# Patient Record
Sex: FEMALE | Race: WHITE | NOT HISPANIC OR LATINO | URBAN - NONMETROPOLITAN AREA
[De-identification: names, ages, dates, MRNs, and addresses within clinical notes are randomized per-mention and may not be internally consistent; named-entity substitution may affect disease eponyms.]

---

## 2024-08-29 ENCOUNTER — OFFICE VISIT (OUTPATIENT)
Dept: URGENT CARE | Facility: CLINIC | Age: 69
End: 2024-08-29
Payer: MEDICARE

## 2024-08-29 VITALS
DIASTOLIC BLOOD PRESSURE: 76 MMHG | SYSTOLIC BLOOD PRESSURE: 122 MMHG | HEIGHT: 65 IN | WEIGHT: 160 LBS | HEART RATE: 81 BPM | BODY MASS INDEX: 26.66 KG/M2 | RESPIRATION RATE: 16 BRPM | TEMPERATURE: 97.8 F | OXYGEN SATURATION: 95 %

## 2024-08-29 DIAGNOSIS — L08.9 SKIN INFECTION: ICD-10-CM

## 2024-08-29 RX ORDER — MUPIROCIN 20 MG/G
1 OINTMENT TOPICAL 2 TIMES DAILY
Qty: 22 G | Refills: 0 | Status: SHIPPED | OUTPATIENT
Start: 2024-08-29

## 2024-08-29 RX ORDER — DOXYCYCLINE HYCLATE 100 MG
100 TABLET ORAL 2 TIMES DAILY
Qty: 10 TABLET | Refills: 0 | Status: SHIPPED | OUTPATIENT
Start: 2024-08-29 | End: 2024-09-03

## 2024-08-30 NOTE — PROGRESS NOTES
"Subjective:   Betsy Jefferson is a 69 y.o. female who presents for Laceration (Patient coming in for L wrist laceration possible infection)      HPI:    Patient presents urgent care with concerns of left distal wrist infection.  She states she was at a restaurant and states she was sitting on collapsed inward and she sustained a deep laceration along the distal.  She did not seek sutures for repair.  She states the wound has been healing up till couple days ago when she started increasing redness, pain.  Denies fever, chills, decreased range of motion, drainage.  Denies history of immunosuppression.  She has been applying antibiotic ointment to it and keeping it clean and dry.    ROS As above in HPI    Medications:    No current outpatient medications on file prior to visit.     No current facility-administered medications on file prior to visit.        Allergies:   Patient has no known allergies.    Problem List:   There is no problem list on file for this patient.       Surgical History:  No past surgical history on file.    Past Social Hx:           Problem list, medications, and allergies reviewed by myself today in Epic.     Objective:     /76   Pulse 81   Temp 36.6 °C (97.8 °F) (Temporal)   Resp 16   Ht 1.651 m (5' 5\")   Wt 72.6 kg (160 lb)   SpO2 95%   BMI 26.63 kg/m²     Physical Exam  Vitals and nursing note reviewed.   Constitutional:       General: She is not in acute distress.     Appearance: Normal appearance. She is not ill-appearing or diaphoretic.   HENT:      Head: Normocephalic.   Cardiovascular:      Rate and Rhythm: Normal rate and regular rhythm.      Heart sounds: Normal heart sounds.   Pulmonary:      Effort: Pulmonary effort is normal.      Breath sounds: Normal breath sounds.   Musculoskeletal:         General: No swelling, deformity or signs of injury.   Skin:     General: Skin is warm and dry.      Capillary Refill: Capillary refill takes less than 2 seconds.      Findings: " Erythema and wound present.      Comments: Left distal ulna has C shaped laceration wound with approximating margins. Mid wound is deep but appears to be healing via secondary intent. No drainage. Not fluctuant. Slightly tender to palpation, erythematous, and warm to the touch. No swelling.   Neurological:      Mental Status: She is alert and oriented to person, place, and time.         Assessment/Plan:       Diagnosis and associated orders:   1. Skin infection  - doxycycline (VIBRAMYCIN) 100 MG Tab; Take 1 Tablet by mouth 2 times a day for 5 days.  Dispense: 10 Tablet; Refill: 0  - mupirocin (BACTROBAN) 2 % Ointment; Apply 1 Application topically 2 times a day.  Dispense: 22 g; Refill: 0        Comments/MDM:     Left distal ulna as C shaped laceration healing by secondary intent. Wound is tender to palpation, erythematous, and warm to the touch. Not fluctuant or swollen. Patient reports two days of worsening pain. Likely inflamed, but will cover for infection with doxycycline. Home wound care instructions reviewed.   Follow up with PCP.       Return to clinic or go to ED if symptoms worsen or persist. Indications for ED discussed at length. Patient/Parent/Guardian voices understanding. Follow-up with your primary care provider in 3-5 days. Red flag symptoms discussed. All side effects of medication discussed including allergic response, GI upset, tendon injury, rash, sedation etc.    Please note that this dictation was created using voice recognition software. I have made a reasonable attempt to correct obvious errors, but I expect that there are errors of grammar and possibly content that I did not discover before finalizing the note.    This note was electronically signed by GARRETT Wilcox

## 2024-09-01 ENCOUNTER — APPOINTMENT (OUTPATIENT)
Dept: RADIOLOGY | Facility: IMAGING CENTER | Age: 69
End: 2024-09-01
Payer: MEDICARE

## 2024-09-01 ENCOUNTER — OFFICE VISIT (OUTPATIENT)
Dept: URGENT CARE | Facility: CLINIC | Age: 69
End: 2024-09-01
Payer: MEDICARE

## 2024-09-01 VITALS
OXYGEN SATURATION: 96 % | TEMPERATURE: 98.6 F | DIASTOLIC BLOOD PRESSURE: 84 MMHG | HEIGHT: 65 IN | WEIGHT: 160 LBS | HEART RATE: 86 BPM | BODY MASS INDEX: 26.66 KG/M2 | RESPIRATION RATE: 20 BRPM | SYSTOLIC BLOOD PRESSURE: 122 MMHG

## 2024-09-01 DIAGNOSIS — S99.922A INJURY OF TOE ON LEFT FOOT, INITIAL ENCOUNTER: ICD-10-CM

## 2024-09-01 PROCEDURE — 73660 X-RAY EXAM OF TOE(S): CPT | Mod: TC,LT

## 2024-09-01 PROCEDURE — 3079F DIAST BP 80-89 MM HG: CPT

## 2024-09-01 PROCEDURE — 99213 OFFICE O/P EST LOW 20 MIN: CPT

## 2024-09-01 PROCEDURE — 3074F SYST BP LT 130 MM HG: CPT

## 2024-09-01 ASSESSMENT — ENCOUNTER SYMPTOMS: FEVER: 0

## 2024-09-01 NOTE — PROGRESS NOTES
Subjective:   Betsy Jefferson is a very pleasant 69 y.o. female who presents for:    Chief Complaint   Patient presents with    Foot Injury     Toe injury x 2 weeks ago       HPI:    Foot Problem  Episode onset: two weeks ago, the patient tripped over a piee of cardboard on her stairs. The pain is localized to the second toe on the left foot. Pertinent negatives include no fever. Associated symptoms comments: Denies wound to the toe, no foot or ankle pain. Pain is exacerbated with movement, walking. She has tried nothing for the symptoms.       ROS:    Review of Systems   Constitutional:  Negative for fever.   Musculoskeletal:         Left second toe pain and bruising       Medications:      Current Outpatient Medications   Medication Sig    mupirocin (BACTROBAN) 2 % Ointment Apply 1 Application topically 2 times a day.    doxycycline (VIBRAMYCIN) 100 MG Tab Take 1 Tablet by mouth 2 times a day for 5 days. (Patient not taking: Reported on 9/1/2024)       Allergies:     No Known Allergies    Problem List:     There is no problem list on file for this patient.      Surgical History:    No past surgical history on file.    Past Social Hx:     Social History     Socioeconomic History    Marital status:         Past Family Hx:      No family history on file.    Problem list, medications, and allergies reviewed by myself today in Epic.     Objective:     Vitals:    09/01/24 1300   BP: 122/84   Pulse: 86   Resp: 20   Temp: 37 °C (98.6 °F)   SpO2: 96%       Physical Exam  Vitals reviewed.   Constitutional:       Appearance: Normal appearance. She is normal weight.   Musculoskeletal:        Feet:    Feet:      Comments: TTP over the volar and plantar aspect of the left second toe.  There is a small amount of ecchymosis overlying the toe.  No swelling.  Decreased range of motion secondary to pain.  No visible deformity.  Neurovascularly intact distal to the site of injury.  No pain at the base of the fifth toe, overlying  the foot, ankle  Neurological:      Mental Status: She is alert.     X-ray images viewed and interpreted by APRN, confirmed by radiology:        RADIOLOGY RESULTS   DX-TOE(S) 2+ LEFT    Result Date: 9/1/2024 9/1/2024 1:52 PM HISTORY/REASON FOR EXAM:  Pain/Deformity Following Trauma; trauma to left second toe. R/O fracture.. TECHNIQUE/EXAM DESCRIPTION AND NUMBER OF VIEWS:  3 views of the LEFT toes. COMPARISON: None FINDINGS: There is no fracture. Alignment is normal. No degenerative changes are present.     Negative left 2nd toe series             Assessment/Plan:     Diagnosis and associated orders:     1. Injury of toe on left foot, initial encounter  - DX-TOE(S) 2+ LEFT          Comments/MDM:     X-ray negative for acute osseous abnormality of the second toe of the left foot  Based on the patient's mechanism of injury, it is highly likely that the patient is suffering from a sprain of the toe  The toe was buddy taped in clinic.  The patient may bear weight as tolerated.  Declined walking boot/orthopedic shoe.  Supportive measures reviewed at length, including ice, elevation, Tylenol/ibuprofen as needed.  Continue with gentle range of motion exercises.  Return to the clinic as needed for worsening symptoms         All questions answered. Patient verbalized understanding and is in agreement with this plan of care.     If symptoms are worsening or not improving in 3-5 days, follow-up with PCP or return to UC. Differential diagnosis, natural history, and supportive care discussed. AVS handout given and reviewed with patient. Patient educated on red flags and when to seek treatment back in ED or UC.     I personally reviewed prior external notes and test results pertinent to today's visit.  I have independently reviewed and interpreted all diagnostics ordered during this urgent care visit.     This dictation has been created using voice recognition software. The accuracy of the dictation is limited by the abilities of  the software. I expect there may be some errors of grammar and possibly content. I made every attempt to manually correct the errors within my dictation. However, errors related to voice recognition software may still exist and should be interpreted within the appropriate context.    This note was electronically signed by DEBBIE Robin